# Patient Record
Sex: MALE | Race: OTHER | Employment: FULL TIME | URBAN - METROPOLITAN AREA
[De-identification: names, ages, dates, MRNs, and addresses within clinical notes are randomized per-mention and may not be internally consistent; named-entity substitution may affect disease eponyms.]

---

## 2017-10-30 ENCOUNTER — HOSPITAL ENCOUNTER (OUTPATIENT)
Age: 42
Discharge: HOME | End: 2017-10-30
Payer: COMMERCIAL

## 2017-10-30 VITALS
HEART RATE: 75 BPM | OXYGEN SATURATION: 97 % | RESPIRATION RATE: 16 BRPM | TEMPERATURE: 98 F | DIASTOLIC BLOOD PRESSURE: 84 MMHG | SYSTOLIC BLOOD PRESSURE: 120 MMHG | BODY MASS INDEX: 32.37 KG/M2 | WEIGHT: 239 LBS | HEIGHT: 72 IN

## 2017-10-30 DIAGNOSIS — H57.89 IRRITATION OF EYE: Primary | ICD-10-CM

## 2017-10-30 DIAGNOSIS — Z83.518: ICD-10-CM

## 2017-10-30 PROCEDURE — 99203 OFFICE O/P NEW LOW 30 MIN: CPT

## 2017-10-30 RX ORDER — POLYMYXIN B SULFATE AND TRIMETHOPRIM 1; 10000 MG/ML; [USP'U]/ML
1 SOLUTION OPHTHALMIC EVERY 4 HOURS
Qty: 10 ML | Refills: 0 | Status: SHIPPED | OUTPATIENT
Start: 2017-10-30 | End: 2017-11-04

## 2017-10-30 NOTE — ED PROVIDER NOTES
Patient Seen in: [facility]    History   Patient presents with:  Eye Problem    Stated Complaint: poss pink eye, today    HPI    43yo M who comes in with complaints of conjunctivitis exposure from daughter.  His wife is concerned mastoid TTP. Nose: Nares symmetrical, mildly erythematous, edematous mucosa. No maxillary or frontal sinus tenderness   Throat: Lips, tongue, and mucosa are moist, pink, and intact; teeth intact. No erythema, edema, exudates of posterior pharynx.  [place] mi primary care provider on file. - as instructed. The patient verbalized understanding of the discharge instructions and plan.

## 2017-10-30 NOTE — ED INITIAL ASSESSMENT (HPI)
Eye  Problem- per pt his wife noted his eyes were red. Pt applied otc clear eyes. Pt denies any itching,discharge. He wants his eyes checked for pink eye.

## 2017-11-22 ENCOUNTER — HOSPITAL ENCOUNTER (OUTPATIENT)
Age: 42
Discharge: HOME | End: 2017-11-22
Attending: FAMILY MEDICINE
Payer: COMMERCIAL

## 2017-11-22 VITALS
HEART RATE: 88 BPM | DIASTOLIC BLOOD PRESSURE: 85 MMHG | TEMPERATURE: 98 F | RESPIRATION RATE: 16 BRPM | OXYGEN SATURATION: 98 % | SYSTOLIC BLOOD PRESSURE: 126 MMHG

## 2017-11-22 DIAGNOSIS — B96.89 ACUTE BACTERIAL RHINOSINUSITIS: Primary | ICD-10-CM

## 2017-11-22 DIAGNOSIS — J01.90 ACUTE BACTERIAL RHINOSINUSITIS: Primary | ICD-10-CM

## 2017-11-22 PROCEDURE — 87081 CULTURE SCREEN ONLY: CPT | Performed by: FAMILY MEDICINE

## 2017-11-22 PROCEDURE — 99214 OFFICE O/P EST MOD 30 MIN: CPT

## 2017-11-22 PROCEDURE — 87430 STREP A AG IA: CPT | Performed by: FAMILY MEDICINE

## 2017-11-22 RX ORDER — AMOXICILLIN 875 MG/1
875 TABLET, COATED ORAL 2 TIMES DAILY
Qty: 14 TABLET | Refills: 0 | Status: SHIPPED | OUTPATIENT
Start: 2017-11-22 | End: 2017-11-29

## 2017-11-22 RX ORDER — FLUTICASONE PROPIONATE 50 MCG
2 SPRAY, SUSPENSION (ML) NASAL DAILY
Qty: 16 G | Refills: 0 | Status: SHIPPED | OUTPATIENT
Start: 2017-11-22 | End: 2017-12-22

## 2017-11-23 NOTE — ED INITIAL ASSESSMENT (HPI)
Complains of cough and sore throat for the last 10 days. States cough is worse at night. Denies any fever.

## 2017-11-23 NOTE — ED PROVIDER NOTES
Patient Seen in: [facility]    History   Patient presents with:  Cough/URI    Stated Complaint: cough and sore throat    HPI    50-year-old gentleman with a history of asthma complains of 10 days of non-productive cough, rhinorr nature, but duration is at 10 days. Patient with comorbidity of asthma. Will start patient on amoxicillin 875 twice daily for 5 days and Flonase.           Disposition and Plan     Clinical Impression:  Acute bacterial rhinosinusitis  (primary encounter d

## 2018-05-02 ENCOUNTER — HOSPITAL ENCOUNTER (OUTPATIENT)
Age: 43
Discharge: HOME | End: 2018-05-02
Attending: FAMILY MEDICINE
Payer: COMMERCIAL

## 2018-05-02 VITALS
SYSTOLIC BLOOD PRESSURE: 130 MMHG | OXYGEN SATURATION: 98 % | TEMPERATURE: 98 F | HEART RATE: 77 BPM | DIASTOLIC BLOOD PRESSURE: 82 MMHG | RESPIRATION RATE: 16 BRPM

## 2018-05-02 DIAGNOSIS — R19.7 DIARRHEA, UNSPECIFIED TYPE: Primary | ICD-10-CM

## 2018-05-02 PROCEDURE — 81002 URINALYSIS NONAUTO W/O SCOPE: CPT | Performed by: FAMILY MEDICINE

## 2018-05-02 PROCEDURE — 99214 OFFICE O/P EST MOD 30 MIN: CPT

## 2018-05-02 PROCEDURE — 99213 OFFICE O/P EST LOW 20 MIN: CPT

## 2018-05-02 RX ORDER — METRONIDAZOLE 500 MG/1
500 TABLET ORAL 3 TIMES DAILY
Qty: 30 TABLET | Refills: 0 | Status: SHIPPED | OUTPATIENT
Start: 2018-05-02 | End: 2018-05-12

## 2018-05-02 NOTE — ED INITIAL ASSESSMENT (HPI)
Patient presents with cc of diarrhea x one week(approx 4-5 stools watery a day)No blood noted. Denies abdominal pain at this time. +Bloated. Had recent tooth extraction bottom right with antibiotic tx. Denies nausea or vomiting.+Recent URI. No fever noted. Recen

## 2018-05-03 ENCOUNTER — LAB ENCOUNTER (OUTPATIENT)
Dept: LAB | Facility: HOSPITAL | Age: 43
End: 2018-05-03
Attending: FAMILY MEDICINE
Payer: COMMERCIAL

## 2018-05-03 DIAGNOSIS — R19.7 DIARRHEA, UNSPECIFIED TYPE: ICD-10-CM

## 2018-05-03 PROCEDURE — 87493 C DIFF AMPLIFIED PROBE: CPT

## 2018-05-03 PROCEDURE — 87427 SHIGA-LIKE TOXIN AG IA: CPT

## 2018-05-03 PROCEDURE — 87272 CRYPTOSPORIDIUM AG IF: CPT

## 2018-05-03 PROCEDURE — 87329 GIARDIA AG IA: CPT

## 2018-05-03 PROCEDURE — 87046 STOOL CULTR AEROBIC BACT EA: CPT

## 2018-05-03 PROCEDURE — 87177 OVA AND PARASITES SMEARS: CPT

## 2018-05-03 PROCEDURE — 87045 FECES CULTURE AEROBIC BACT: CPT

## 2018-05-03 PROCEDURE — 87209 SMEAR COMPLEX STAIN: CPT

## 2018-05-03 NOTE — ED NOTES
Stool collection kit and instruction given. Instruction to bring specimen within an hour of collection.

## 2018-05-03 NOTE — ED NOTES
Pt called asking for name of bacteria that Dr. Mata Ventura thought may be his infection after taking antibiotics. I suggested that C-Diff may cause diarrhea after antibiotic use. Pt states that was the name the Dr said. No further questions.

## 2018-05-03 NOTE — ED PROVIDER NOTES
Patient Seen in: [facility]    History   Patient presents with:  Diarrhea    Stated Complaint: stomach pain    HPI    36year old male presents for diarrhea for past 1 week. States he has 4-5 bouts of loose BM daily.  He reports POCT URINALYSIS DIPSTICK - Abnormal; Notable for the following:        Result Value    Urine Clarity Slightly cloudy (*)     Ketone, Urine Trace (*)     Protein urine Trace (*)     All other components within normal limits       ED Course as of May 02 23

## 2018-08-31 ENCOUNTER — HOSPITAL ENCOUNTER (OUTPATIENT)
Age: 43
Discharge: HOME | End: 2018-08-31
Attending: EMERGENCY MEDICINE
Payer: COMMERCIAL

## 2018-08-31 VITALS
HEART RATE: 92 BPM | DIASTOLIC BLOOD PRESSURE: 89 MMHG | SYSTOLIC BLOOD PRESSURE: 143 MMHG | TEMPERATURE: 98 F | RESPIRATION RATE: 18 BRPM | OXYGEN SATURATION: 99 %

## 2018-08-31 DIAGNOSIS — R39.9 LOWER URINARY TRACT SYMPTOMS (LUTS): Primary | ICD-10-CM

## 2018-08-31 LAB
POCT BILIRUBIN URINE: NEGATIVE
POCT BLOOD URINE: NEGATIVE
POCT GLUCOSE URINE: NEGATIVE MG/DL
POCT KETONE URINE: NEGATIVE MG/DL
POCT LEUKOCYTE ESTERASE URINE: NEGATIVE
POCT NITRITE URINE: NEGATIVE
POCT PH URINE: 7 (ref 5–8)
POCT SPECIFIC GRAVITY URINE: 1.02
POCT URINE CLARITY: CLEAR
POCT URINE COLOR: YELLOW
POCT UROBILINOGEN URINE: 0.2 MG/DL

## 2018-08-31 PROCEDURE — 81002 URINALYSIS NONAUTO W/O SCOPE: CPT | Performed by: EMERGENCY MEDICINE

## 2018-08-31 PROCEDURE — 99214 OFFICE O/P EST MOD 30 MIN: CPT

## 2018-08-31 PROCEDURE — 99213 OFFICE O/P EST LOW 20 MIN: CPT

## 2018-08-31 RX ORDER — TAMSULOSIN HYDROCHLORIDE 0.4 MG/1
0.4 CAPSULE ORAL DAILY
Qty: 7 CAPSULE | Refills: 0 | Status: SHIPPED | OUTPATIENT
Start: 2018-08-31 | End: 2018-09-07

## 2018-08-31 NOTE — ED PROVIDER NOTES
Patient Seen in: [facility] END    History   Patient presents with:  Urinary Symptoms (urologic)    Stated Complaint: urinary issue 2 days    HPI    The patient is a 26-year-old male presenting to the emergency department due to low facial droop or slurred speech. CN II-XII intact. Grossly normal and symmetric motor strength and sensation proximally and distally throughout all 4 extremities. HEENT: No lymphadenopathy, TMs nml. Oropharynx nml.  Mucus membranes moist.   Supple neck wit ultimately need biopsy considering he is such a strong family history of prostate cancer. He feels comfortable with this plan is discharged home in stable condition.         Disposition and Plan     Clinical Impression:  Lower urinary tract symptoms (LUTS)

## 2018-08-31 NOTE — ED INITIAL ASSESSMENT (HPI)
C/O urinary frequency that started yesterday. Worse today. Sts that the symptoms usually occur when he holds his urine for awhile.

## 2018-09-10 ENCOUNTER — TELEPHONE (OUTPATIENT)
Dept: URGENT CARE | Age: 43
End: 2018-09-10

## 2018-09-10 NOTE — ED NOTES
Patient called requesting the name of Urologist that was given last week, Dr. Indigo Faye phone number given per AVS.

## 2020-09-28 ENCOUNTER — HOSPITAL ENCOUNTER (OUTPATIENT)
Age: 45
Discharge: HOME | End: 2020-09-28
Payer: COMMERCIAL

## 2020-09-28 VITALS
DIASTOLIC BLOOD PRESSURE: 71 MMHG | TEMPERATURE: 98 F | RESPIRATION RATE: 18 BRPM | SYSTOLIC BLOOD PRESSURE: 125 MMHG | HEART RATE: 87 BPM | OXYGEN SATURATION: 98 %

## 2020-09-28 DIAGNOSIS — Z20.822 CLOSE EXPOSURE TO COVID-19 VIRUS: Primary | ICD-10-CM

## 2020-09-28 PROCEDURE — 99213 OFFICE O/P EST LOW 20 MIN: CPT

## 2020-09-28 RX ORDER — ASCORBIC ACID 500 MG
TABLET ORAL
COMMUNITY
End: 2021-03-18 | Stop reason: ALTCHOICE

## 2020-09-28 NOTE — ED PROVIDER NOTES
Patient Seen in: [facility]      History   Patient presents with:  Testing    Stated Complaint: exposure to covid    HPI  54-year-old male he does not smoke who has a history of asthma presents for a COVID test due to exposure t time.               ED Course     Labs Reviewed   2019 NOVEL CORONAVIRUS SARS-COV-2 BY PCR([facility])                  MDM      COVID test sent. Patient instructed on quarantine, symptomatic treatment, and return precautions.               Disposition and Plan

## 2021-01-14 ENCOUNTER — HOSPITAL ENCOUNTER (OUTPATIENT)
Age: 46
Discharge: HOME | End: 2021-01-14
Payer: COMMERCIAL

## 2021-01-14 VITALS
SYSTOLIC BLOOD PRESSURE: 122 MMHG | BODY MASS INDEX: 32 KG/M2 | RESPIRATION RATE: 20 BRPM | HEART RATE: 102 BPM | TEMPERATURE: 98 F | DIASTOLIC BLOOD PRESSURE: 69 MMHG | OXYGEN SATURATION: 95 % | WEIGHT: 238 LBS

## 2021-01-14 DIAGNOSIS — R61 NIGHT SWEATS: Primary | ICD-10-CM

## 2021-01-14 DIAGNOSIS — Z20.822 LAB TEST NEGATIVE FOR COVID-19 VIRUS: ICD-10-CM

## 2021-01-14 LAB — SARS-COV-2 RNA RESP QL NAA+PROBE: NOT DETECTED

## 2021-01-14 PROCEDURE — 99212 OFFICE O/P EST SF 10 MIN: CPT

## 2021-01-14 PROCEDURE — 99213 OFFICE O/P EST LOW 20 MIN: CPT

## 2021-01-14 NOTE — ED PROVIDER NOTES
Patient Seen in: [facility] [place]      History   Patient presents with:  Testing    Stated Complaint: covid test    HPI/Subjective:   HPI    Pavel Feliciano is a 44-year-old male who presents today for Covid testing.   He has a past medical history of as edema or tenderness. Neurological: CN III - XII grossly intact, normal strength and sensation. Skin: Skin is warm and dry. No rash noted. No erythema.     ED Course     Labs Reviewed   RAPID SARS-COV-2 BY PCR - Normal                   MDM    Patient is

## 2021-03-18 ENCOUNTER — HOSPITAL ENCOUNTER (OUTPATIENT)
Age: 46
Discharge: HOME | End: 2021-03-18
Attending: EMERGENCY MEDICINE
Payer: COMMERCIAL

## 2021-03-18 VITALS
DIASTOLIC BLOOD PRESSURE: 82 MMHG | RESPIRATION RATE: 16 BRPM | OXYGEN SATURATION: 98 % | HEART RATE: 80 BPM | SYSTOLIC BLOOD PRESSURE: 119 MMHG | TEMPERATURE: 99 F

## 2021-03-18 DIAGNOSIS — Z20.822 ENCOUNTER FOR LABORATORY TESTING FOR COVID-19 VIRUS: Primary | ICD-10-CM

## 2021-03-18 LAB — SARS-COV-2 RNA RESP QL NAA+PROBE: NOT DETECTED

## 2021-03-18 PROCEDURE — 99212 OFFICE O/P EST SF 10 MIN: CPT

## 2021-03-18 PROCEDURE — 99213 OFFICE O/P EST LOW 20 MIN: CPT

## 2021-03-18 NOTE — ED INITIAL ASSESSMENT (HPI)
Pt presents today for covid testing. Pt states that his daughter tested + for covid this morning and she lives with him. Pt denies any symptoms at this time.

## 2021-03-18 NOTE — ED PROVIDER NOTES
Patient Seen in: [facility]      History   Patient presents with:  Covid-19 Test    Stated Complaint: covid test    HPI/Subjective:   HPI    50-year-old male presents to the emergency department for COVID-19 testing.   He vacationed in the Test results and treatment plan were discussed including self quarantining for 14 days with retesting if the patient becomes symptomatic during that time.          MDM      Encounter for COVID-19 testing                         Disposition and Plan     Clin

## 2022-01-06 ENCOUNTER — HOSPITAL ENCOUNTER (OUTPATIENT)
Age: 47
Discharge: HOME | End: 2022-01-06
Payer: COMMERCIAL

## 2022-01-06 VITALS
WEIGHT: 240 LBS | DIASTOLIC BLOOD PRESSURE: 78 MMHG | OXYGEN SATURATION: 97 % | HEART RATE: 78 BPM | RESPIRATION RATE: 16 BRPM | TEMPERATURE: 100 F | SYSTOLIC BLOOD PRESSURE: 115 MMHG | BODY MASS INDEX: 33 KG/M2

## 2022-01-06 DIAGNOSIS — Z20.822 SUSPECTED COVID-19 VIRUS INFECTION: Primary | ICD-10-CM

## 2022-01-06 PROCEDURE — 99213 OFFICE O/P EST LOW 20 MIN: CPT

## 2022-01-07 NOTE — ED PROVIDER NOTES
Patient Seen in: [facility] [place]      History   Patient presents with:  Cough/URI    Stated Complaint: pt would like a covid test due to daughter tested positive yesterday      Subjective:   HPI  Patient is 52-year male with past past medical Eyes:      Conjunctiva/sclera: Conjunctivae normal.   Cardiovascular:      Rate and Rhythm: Normal rate and regular rhythm. Pulses: Normal pulses. Heart sounds: Normal heart sounds.    Pulmonary:      Effort: Pulmonary effort is normal.      Dona

## 2022-01-07 NOTE — ED INITIAL ASSESSMENT (HPI)
Patient reports his daughter has tested positive for covid, and he would like a covid test.  Patient reports he has a cough, and had the flu last week.

## 2022-01-08 LAB — SARS-COV-2 RNA RESP QL NAA+PROBE: NOT DETECTED

## 2024-01-20 ENCOUNTER — HOSPITAL ENCOUNTER (OUTPATIENT)
Age: 49
Discharge: HOME | End: 2024-01-20
Payer: COMMERCIAL

## 2024-01-20 ENCOUNTER — APPOINTMENT (OUTPATIENT)
Dept: GENERAL RADIOLOGY | Age: 49
End: 2024-01-20
Attending: NURSE PRACTITIONER
Payer: COMMERCIAL

## 2024-01-20 VITALS
DIASTOLIC BLOOD PRESSURE: 76 MMHG | RESPIRATION RATE: 18 BRPM | TEMPERATURE: 97 F | HEIGHT: 72 IN | BODY MASS INDEX: 31.83 KG/M2 | HEART RATE: 79 BPM | WEIGHT: 235 LBS | SYSTOLIC BLOOD PRESSURE: 127 MMHG | OXYGEN SATURATION: 100 %

## 2024-01-20 DIAGNOSIS — M70.21 OLECRANON BURSITIS OF RIGHT ELBOW: Primary | ICD-10-CM

## 2024-01-20 PROCEDURE — 99214 OFFICE O/P EST MOD 30 MIN: CPT

## 2024-01-20 PROCEDURE — 99213 OFFICE O/P EST LOW 20 MIN: CPT

## 2024-01-20 PROCEDURE — 73080 X-RAY EXAM OF ELBOW: CPT | Performed by: NURSE PRACTITIONER

## 2024-01-20 NOTE — DISCHARGE INSTRUCTIONS
Rest: Rest and protect the injured or sore area. Stop, change, or take a break from any activity that may be causing your pain or soreness.  Ice: Cold will reduce pain and swelling. Apply an ice or cold pack right away to prevent or minimize swelling. Apply the ice or cold pack for 10 to 20 minutes, 3 or more times a day. After 48 to 72 hours, if swelling is gone, apply heat to the area that hurts. Do not apply ice or heat directly to the skin. Place a towel over the cold or heat pack before applying it to the skin.  Compression: Compression, or wrapping the injured or sore area with an elastic bandage (such as an Ace wrap), will help decrease swelling. Don't wrap it too tightly, because this can cause more swelling below the affected area. Loosen the bandage if it gets too tight. Signs that the bandage is too tight include numbness, tingling, increased pain, coolness, or swelling in the area below the bandage. Talk to your doctor if you think you need to use a wrap for longer than 48 to 72 hours; a more serious problem may be present.  Elevation: Elevate the injured or sore area on pillows while applying ice and anytime you are sitting or lying down. Try to keep the area at or above the level of your heart to help minimize swelling.

## 2024-01-20 NOTE — ED INITIAL ASSESSMENT (HPI)
Patient reports he bumped his elbow during the week and yesterday noticed it was swollen.  Patient denies pain.

## 2024-01-20 NOTE — ED PROVIDER NOTES
Patient Seen in: [facility]      History     Chief Complaint   Patient presents with    Arm or Hand Injury     Stated Complaint: right elbow pain    Subjective:   48-year-old male presents to immediate care for right elbow pain.  Patient states about a week ago he struck his elbow on a hard portion of his car door as he was getting out.  He is no something increased swelling to that area.  Denies any wrist or shoulder pain            Objective:   Past Medical History:   Diagnosis Date    Asthma     H/O extrinsic asthma               History reviewed. No pertinent surgical history.             Social History     Socioeconomic History    Marital status:    Tobacco Use    Smoking status: Never    Smokeless tobacco: Never   Vaping Use    Vaping Use: Never used   Substance and Sexual Activity    Alcohol use: Yes    Drug use: Not Currently              Review of Systems    Positive for stated complaint: right elbow pain  Other systems are as noted in HPI.  Constitutional and vital signs reviewed.      All other systems reviewed and negative except as noted above.    Physical Exam     ED Triage Vitals [01/20/24 1326]   /76   Pulse 79   Resp 18   Temp 97.4 °F (36.3 °C)   Temp src Temporal   SpO2 100 %   O2 Device None (Room air)       Current:/76   Pulse 79   Temp 97.4 °F (36.3 °C) (Temporal)   Resp 18   Ht 182.9 cm (6')   Wt 106.6 kg   SpO2 100%   BMI 31.87 kg/m²         Physical Exam  Vitals and nursing note reviewed.   Constitutional:       General: He is not in acute distress.  HENT:      Head: Normocephalic.   Cardiovascular:      Rate and Rhythm: Normal rate.   Pulmonary:      Effort: Pulmonary effort is normal.   Musculoskeletal:         General: Normal range of motion.      Right elbow: Swelling present.        Arms:    Skin:     General: Skin is warm and dry.   Neurological:      General: No focal deficit present.      Mental Status: He is alert and oriented to person,  place, and time.               ED Course   Labs Reviewed - No data to display  XR ELBOW, COMPLETE (MIN 3 VIEWS), RIGHT (CPT=73080)    Result Date: 1/20/2024  PROCEDURE:  XR ELBOW, COMPLETE (MIN 3 VIEWS), RIGHT (CPT=73080)  TECHNIQUE:  Three views were obtained.  COMPARISON:  None.  INDICATIONS:  right elbow pain  PATIENT STATED HISTORY: (As transcribed by Technologist)  Patient states posterior right elbow swelling after bumping it yesterday. No pain. Shielded   FINDINGS:  No evidence of acute displaced fracture or dislocation.  Normal mineralization.  Mild soft tissue swelling overlying the olecranon process with underlying olecranon bursitis not entirely excluded.  Clinical correlation recommended.  Mild osteoarthritic changes in the right elbow with possible small free bodies measuring up to 4 mm.  This could be further assessed with nonemergent MRI of the right elbow as clinically directed.             CONCLUSION:  No evidence of acute displaced fracture or dislocation in the right elbow. Mild soft tissue swelling overlying the olecranon process with underlying olecranon bursitis not entirely excluded.  Clinical correlation recommended.  Mild osteoarthritic changes in the right elbow with possible small free bodies measuring up to 4 mm.  This could be further assessed with nonemergent MRI of the right elbow as clinically directed.   LOCATION:  [facility]    Dictated by (CST): Joe Walden MD on 1/20/2024 at 2:21 PM     Finalized by (CST): Joe Walden MD on 1/20/2024 at 2:22 PM                       MDM      Medical Decision Making  Pertinent Labs & Imaging studies reviewed. (See chart for details).  Patient coming in with right elbow pain.   Differential diagnosis includes likely bursitis, elbow fracture   Radiology x-rays not reveal acute fracture.  Will treat for olecranon bursitis.  Will discharge on RICE. Patient is comfortable with this plan.     Overall Pt looks good. Non-toxic, well-hydrated and in no  respiratory distress. Vital signs are reassuring. Exam is reassuring. I do not believe pt requires and additional diagnostic studies or intervention. I believe pt can be discharged home to continue evaluation as an outpatient. Follow-up provider given. Discharge instructions given and reviewed. Return for any problems. All understand and agreewith the plan.     Please note that this report has been produced using speech recognition software and may contain errors related to that system including, but not limited to, errors in grammar, punctuation, and spelling, as well as words and phrases that possibly may have been recognized inappropriately. If there are any questions or concerns, contact the dictating provider for clarification.       Problems Addressed:  Olecranon bursitis of right elbow: acute illness or injury    Amount and/or Complexity of Data Reviewed  Radiology: ordered and independent interpretation performed. Decision-making details documented in ED Course.     Details: I have personally reviewed the x-ray films no acute fracture or dislocation noted        Disposition and Plan     Clinical Impression:  1. Olecranon bursitis of right elbow         Disposition:  Discharge  1/20/2024  2:38 pm    Follow-up:  [facility], [address] [place] [id number]  [phone]              Medications Prescribed:  There are no discharge medications for this patient.